# Patient Record
(demographics unavailable — no encounter records)

---

## 2025-04-15 NOTE — HISTORY OF PRESENT ILLNESS
[FreeTextEntry1] : 82yo male referred by Dr Acuna for decreased appetite  Increased abdominal distetion